# Patient Record
Sex: FEMALE | Race: BLACK OR AFRICAN AMERICAN | NOT HISPANIC OR LATINO | URBAN - METROPOLITAN AREA
[De-identification: names, ages, dates, MRNs, and addresses within clinical notes are randomized per-mention and may not be internally consistent; named-entity substitution may affect disease eponyms.]

---

## 2024-04-11 ENCOUNTER — OFFICE VISIT (OUTPATIENT)
Dept: OBSTETRICS AND GYNECOLOGY | Facility: CLINIC | Age: 34
End: 2024-04-11
Payer: COMMERCIAL

## 2024-04-11 VITALS
BODY MASS INDEX: 27.78 KG/M2 | SYSTOLIC BLOOD PRESSURE: 102 MMHG | HEIGHT: 63 IN | DIASTOLIC BLOOD PRESSURE: 76 MMHG | WEIGHT: 156.8 LBS

## 2024-04-11 DIAGNOSIS — N76.1 SUBACUTE VAGINITIS: Primary | ICD-10-CM

## 2024-04-11 DIAGNOSIS — Z97.5 IUD (INTRAUTERINE DEVICE) IN PLACE: ICD-10-CM

## 2024-04-11 DIAGNOSIS — F43.0 STRESS DISORDER, ACUTE: ICD-10-CM

## 2024-04-11 PROCEDURE — 1036F TOBACCO NON-USER: CPT | Performed by: OBSTETRICS & GYNECOLOGY

## 2024-04-11 PROCEDURE — 87205 SMEAR GRAM STAIN: CPT | Performed by: OBSTETRICS & GYNECOLOGY

## 2024-04-11 PROCEDURE — 99214 OFFICE O/P EST MOD 30 MIN: CPT | Performed by: OBSTETRICS & GYNECOLOGY

## 2024-04-11 PROCEDURE — 87109 MYCOPLASMA: CPT | Performed by: OBSTETRICS & GYNECOLOGY

## 2024-04-11 RX ORDER — ACYCLOVIR 400 MG/1
400 TABLET ORAL
COMMUNITY
Start: 2022-03-04

## 2024-04-11 RX ORDER — VALACYCLOVIR HYDROCHLORIDE 500 MG/1
500 TABLET, FILM COATED ORAL EVERY 24 HOURS
COMMUNITY
Start: 2023-03-20

## 2024-04-11 RX ORDER — FLUCONAZOLE 150 MG/1
150 TABLET ORAL ONCE
Qty: 2 TABLET | Refills: 3 | Status: SHIPPED | OUTPATIENT
Start: 2024-04-11 | End: 2024-04-11

## 2024-04-11 RX ORDER — NYSTATIN AND TRIAMCINOLONE ACETONIDE 100000; 1 [USP'U]/G; MG/G
OINTMENT TOPICAL 2 TIMES DAILY
Qty: 15 G | Refills: 1 | Status: SHIPPED | OUTPATIENT
Start: 2024-04-11

## 2024-04-11 ASSESSMENT — LIFESTYLE VARIABLES
SKIP TO QUESTIONS 9-10: 1
AUDIT-C TOTAL SCORE: 2
HOW OFTEN DO YOU HAVE A DRINK CONTAINING ALCOHOL: 2-4 TIMES A MONTH
HOW OFTEN DO YOU HAVE SIX OR MORE DRINKS ON ONE OCCASION: NEVER
HOW MANY STANDARD DRINKS CONTAINING ALCOHOL DO YOU HAVE ON A TYPICAL DAY: 1 OR 2

## 2024-04-11 ASSESSMENT — ENCOUNTER SYMPTOMS
LOSS OF SENSATION IN FEET: 0
DEPRESSION: 0
OCCASIONAL FEELINGS OF UNSTEADINESS: 0

## 2024-04-11 ASSESSMENT — PATIENT HEALTH QUESTIONNAIRE - PHQ9
1. LITTLE INTEREST OR PLEASURE IN DOING THINGS: SEVERAL DAYS
2. FEELING DOWN, DEPRESSED OR HOPELESS: SEVERAL DAYS
SUM OF ALL RESPONSES TO PHQ9 QUESTIONS 1 & 2: 2
10. IF YOU CHECKED OFF ANY PROBLEMS, HOW DIFFICULT HAVE THESE PROBLEMS MADE IT FOR YOU TO DO YOUR WORK, TAKE CARE OF THINGS AT HOME, OR GET ALONG WITH OTHER PEOPLE: SOMEWHAT DIFFICULT

## 2024-04-11 ASSESSMENT — PAIN SCALES - GENERAL: PAINLEVEL: 4

## 2024-04-11 ASSESSMENT — SOCIAL DETERMINANTS OF HEALTH (SDOH)
WITHIN THE LAST YEAR, HAVE TO BEEN RAPED OR FORCED TO HAVE ANY KIND OF SEXUAL ACTIVITY BY YOUR PARTNER OR EX-PARTNER?: NO
WITHIN THE LAST YEAR, HAVE YOU BEEN KICKED, HIT, SLAPPED, OR OTHERWISE PHYSICALLY HURT BY YOUR PARTNER OR EX-PARTNER?: NO
WITHIN THE LAST YEAR, HAVE YOU BEEN AFRAID OF YOUR PARTNER OR EX-PARTNER?: NO
WITHIN THE LAST YEAR, HAVE YOU BEEN HUMILIATED OR EMOTIONALLY ABUSED IN OTHER WAYS BY YOUR PARTNER OR EX-PARTNER?: NO

## 2024-04-11 NOTE — PROGRESS NOTES
GYN OFFICE VISIT    Patient Name:  Bobby Lowry  :  1990  MR #:  [id number]  [id number]      ASSESSMENT/PLAN:     1. Subacute vaginitis    - Vaginitis Gram Stain For Bacterial Vaginosis + Yeast  - Ureaplasma/Mycoplasma Culture  - fluconazole (Diflucan) 150 mg tablet; Take 1 tablet (150 mg) by mouth 1 time for 1 dose. Take 1 tablet PO now. Take the 2nd tablet after 3 days for yeast infection  Dispense: 2 tablet; Refill: 3  - nystatin-triamcinolone (Mycolog II) ointment; Apply topically 2 times a day. External genitalia for fungal rash/itching for 1 week.  Dispense: 15 g; Refill: 1    2. Stress disorder, acute    - multivitamin with minerals (multivitamin-iron-folic acid) tablet; Take 1 tablet by mouth once daily.  Dispense: 30 tablet; Refill: 11    3. IUD (intrauterine device) in place    -Encouraged mood support and stress reduction    -Medication education:   All new and/or current medications discussed and reviewed including side effects with patient/caregiver, Understanding:  Caregiver/Patient expressed understanding., Adherence:  Barriers to adherence identified and discussed if present.  -Preventative hygiene reviewed.     No follow-ups on file.      Chief Complaint   Patient presents with    Vaginitis/Bacterial Vaginosis     Duration: 5 days   Symptoms: irritated, itching, no discharge   Color of discharge: NA   Odor: None   OTC Treatments: None          Bobby Lowry is a 34 y.o. C. Female who presents for  .   Patient's last menstrual period was 2024 (exact date).. Genital area is sensitive to touch. No outbreak. Itching travels. No discharge. Burning when urine hits skin. Not concerned about STDS.  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    History reviewed. No pertinent past medical history.    Social History     Tobacco Use    Smoking status: Never    Smokeless tobacco: Never   Vaping Use    Vaping status: Never Used   Substance Use Topics     "Alcohol use: Yes    Drug use: Yes     Types: Marijuana        Family History   Problem Relation Name Age of Onset    Breast cancer Mother           Current Outpatient Medications:     acetaminophen (Tylenol) 160 mg/5 mL liquid, TAKE 30 ML BY MOUTH EVERY 6 HOURS AS NEEDED FOR PAIN, Disp: 853.16 mL, Rfl: 0    acyclovir (Zovirax) 400 mg tablet, Take 1 tablet (400 mg) by mouth., Disp: , Rfl:     ibuprofen 100 mg/5 mL suspension, TAKE 30 ML BY MOUTH EVERY 6 HOURS AS NEEDED FOR PAIN, Disp: 840 mL, Rfl: 0    valACYclovir (Valtrex) 500 mg tablet, Take 1 tablet (500 mg) by mouth once every 24 hours., Disp: , Rfl:     multivitamin with minerals (multivitamin-iron-folic acid) tablet, Take 1 tablet by mouth once daily., Disp: 30 tablet, Rfl: 11     No Known Allergies         ROS:  [facility] - GENERAL:          Chills no.  Fever no.  Loss of Weight no.  Sweats no.  Fatigue no.  Weight gain no.        [facility] - GASTROINTESTINAL:          Appetite Poor no.  Bloating no.  Constipation no.  Diarrhea no.  Hemorrhoids no.  Indigestion no.  Nausea no.  Rectal Bleeding no.  Stomach Pain no  Vomiting no.    [facility] - GENITO-URINARY:          Blood in Urine no.  Frequent Urination no.  Lack of Bladder Control no.  Painful Urination no.  Heavy/Irregular periods no/yes .  Vaginal discharge no.  Vaginal odor no.  Vaginal itching no.  Post-coital bleeding no.      [facility] - MUSCLE/JOINT/BONE:          Back no.  Joint pain yes.    [facility] - SKIN:          Bruise easily no.  Itching no.   Rash no.  Sore that won't heal no.  Vulvar Lesions no.  Acne no acute problems.      [facility] - ROS Update:          Depression or anxiety problems no. Job promotions.  Stressfrul.      OBJECTIVE:   /76   Ht 1.6 m (5' 3\")   Wt 71.1 kg (156 lb 12.8 oz)   LMP 03/12/2024 (Exact Date)   BMI 27.78 kg/m²   Body mass index is 27.78 kg/m².     Physical Exam  GENERAL:   General Appearance:  well-developed, well-nourished, no functional handicap, well-groomed.  Hygiene:  good.  " Ill-appearance:  none.    ABDOMEN: Tenderness:  none.  Distention:  none.   GENITOURINARY - FEMALE: Bladder:  normal.  Pelvic support defects:  not seen .  External genitalia:  Normal.  Urethra:  Normal.  Vagina:  no lesions, moderate discharge,   Cervix/ cuff:  normal appearance .  Strings are visible but folded in cervical canal. Tried to teas down, unsuccessful - stopped out of fear of removing IUD.  Uterus:  normal size/shape/consistency, non tender.  Adnexa:  normal , non tender.   DERMATOLOGY:  Skin: no acute change  EXTREMITIES: Normal:  no anomalies.  Edema:  none.    NEUROLOGICAL: Orientation:  alert and oriented x 3.   PSYCHOLOGY: Affect:  appropriate.  Mood:  pleasant.     Previous/current LABS reviewed: Yes  Previous/current RADIOLOGY reviewed: Yes    Note: This dictation was generated using Dragon voice recognition software. Please excuse any grammatical or spelling errors that may have occurred using the system.

## 2024-04-11 NOTE — LETTER
April 11, 2024     Patient: Bobby Lowry   YOB: 1990   Date of Visit: 4/11/2024       To Whom It May Concern:    Bobby Lowry was seen in my clinic on 4/11/2024 at 8:45 am. Please excuse Bobby for her absence from work on this day to make the appointment.    If you have any questions or concerns, please don't hesitate to call.         Sincerely,         Christina Silva,         CC: No Recipients

## 2024-04-12 LAB
CLUE CELLS VAG LPF-#/AREA: ABNORMAL /[LPF]
NUGENT SCORE: 0
YEAST VAG WET PREP-#/AREA: PRESENT

## 2024-04-16 DIAGNOSIS — B99.9 GENITAL INFECTION: Primary | ICD-10-CM

## 2024-04-16 LAB — UREAPLASMA/MYCOPLASMA CULTURE: NORMAL

## 2024-04-16 RX ORDER — DOXYCYCLINE 100 MG/1
100 CAPSULE ORAL 2 TIMES DAILY
Qty: 14 CAPSULE | Refills: 0 | Status: SHIPPED | OUTPATIENT
Start: 2024-04-16 | End: 2024-04-23

## 2024-04-16 RX ORDER — AZITHROMYCIN 250 MG/1
TABLET, FILM COATED ORAL
Qty: 6 TABLET | Refills: 0 | Status: SHIPPED | OUTPATIENT
Start: 2024-04-16 | End: 2024-04-21